# Patient Record
Sex: MALE
[De-identification: names, ages, dates, MRNs, and addresses within clinical notes are randomized per-mention and may not be internally consistent; named-entity substitution may affect disease eponyms.]

---

## 2024-08-05 ENCOUNTER — APPOINTMENT (OUTPATIENT)
Dept: NEPHROLOGY | Facility: CLINIC | Age: 44
End: 2024-08-05

## 2024-08-05 PROBLEM — Z00.00 ENCOUNTER FOR PREVENTIVE HEALTH EXAMINATION: Status: ACTIVE | Noted: 2024-08-05

## 2024-08-05 PROBLEM — R35.1 NOCTURIA: Status: ACTIVE | Noted: 2024-08-05

## 2024-08-05 PROBLEM — E66.9 CLASS 1 OBESITY: Status: ACTIVE | Noted: 2024-08-05

## 2024-08-05 PROBLEM — R79.89 ELEVATED SERUM CREATININE: Status: ACTIVE | Noted: 2024-08-05

## 2024-08-05 PROCEDURE — 99204 OFFICE O/P NEW MOD 45 MIN: CPT | Mod: 25

## 2024-08-05 PROCEDURE — 36415 COLL VENOUS BLD VENIPUNCTURE: CPT

## 2024-08-05 NOTE — HISTORY OF PRESENT ILLNESS
[FreeTextEntry1] : Kindly referred by Dr. Lee for CKD.  * Labs reviewed. [id number]: Creatinine 1.46, eGFR 60 by CKD-EPIcr. Cr was 1.53 in Jul23. Lifts weights.   No exposure to chronic NSAIDs, chronic PPIs, green smoothies, creatine, or herbal supplements. No history of kidney stones or pyelonephritis. No HTN or DM. 3x nocturia for many years. No recent renal ultrasound. No known proteinuria or hematuria.  FH: No kidney disease. SH: No smoking. No ETOH.  All: NKDA. Meds: None.

## 2024-08-05 NOTE — ASSESSMENT
[FreeTextEntry1] : # Elevated creatinine possibly c/w CKD vs. increased muscle mass. * Recheck labs, including cystatin C, monoclonal protein evaluation, urinalysis, and urine albumin quantification. * Check ultrasound. * Therapies for kidney disease: other evidence-based therapies recommended including exercise, a plant-based lower oxalate diet, and 400 mcg folic acid daily * Cardiovascular disease prevention: counseling on healthy diet, physical activity, weight loss, alcohol limitation, blood pressure control * A counseling information sheet on CKD has been given (which they have been instructed to read). * The patient has been counseled that chronic kidney disease is a significant condition and regular office follow-up with me (at least every 1 months for now) is important for monitoring and their health, and that it is their responsibility to make a follow-up appointment. * The patient has been counseled never to stop taking their medications without discussing it with me or another doctor. * The patient has been counseled on avoiding NSAIDs. * The patient has been counseled on risk of worsening kidney function and instructed to immediately call and speak with me and go immediately to ER with any severe symptoms, nausea, vomiting, diarrhea, chest pain, or shortness of breath.  # Nocturia (chronic).  * Check bladder ultrasound.  # Obesity. * Weight loss.

## 2024-08-05 NOTE — CONSULT LETTER
[FreeTextEntry1] : Dear JAYCEE,  I had the pleasure of seeing Darrel Bustillo in consultation for elevated creatinine. My note is attached.  Thank you for the opportunity to participate in the care of your patient.  Please call me on my mobile phone at [phone] or in the office at [phone] if you have any questions.  Best regards,  German Esparza MD, FACP, FASN [address] #[facility], [place], [place] [email or web address] Office: [phone] Mobile: [phone]

## 2024-08-15 ENCOUNTER — APPOINTMENT (OUTPATIENT)
Dept: ULTRASOUND IMAGING | Facility: CLINIC | Age: 44
End: 2024-08-15
Payer: COMMERCIAL

## 2024-08-15 PROCEDURE — 76770 US EXAM ABDO BACK WALL COMP: CPT | Mod: 26

## 2024-09-09 ENCOUNTER — APPOINTMENT (OUTPATIENT)
Dept: NEPHROLOGY | Facility: CLINIC | Age: 44
End: 2024-09-09
Payer: COMMERCIAL

## 2024-09-09 VITALS — SYSTOLIC BLOOD PRESSURE: 124 MMHG | HEART RATE: 74 BPM | DIASTOLIC BLOOD PRESSURE: 69 MMHG

## 2024-09-09 DIAGNOSIS — R79.89 OTHER SPECIFIED ABNORMAL FINDINGS OF BLOOD CHEMISTRY: ICD-10-CM

## 2024-09-09 DIAGNOSIS — R03.0 ELEVATED BLOOD-PRESSURE READING, W/OUT DIAGNOSIS OF HYPERTENSION: ICD-10-CM

## 2024-09-09 DIAGNOSIS — E66.9 OBESITY, UNSPECIFIED: ICD-10-CM

## 2024-09-09 PROCEDURE — 99214 OFFICE O/P EST MOD 30 MIN: CPT

## 2024-09-09 PROCEDURE — G2211 COMPLEX E/M VISIT ADD ON: CPT | Mod: NC

## 2024-09-09 NOTE — ASSESSMENT
[FreeTextEntry1] : # Elevated creatinine most c/w increased muscle mass rather than CKD. * Follow up in 6 months and recheck labs. * Slightly increased echogenicity of ultrasound is of questionable significance.   # Vitamin D deficiency. * Cont vitamin D.  # Borderline blood pressure. * The patient's blood pressure was checked with the [employer] [id number] using the SPRINT trial protocol after sitting quietly in an empty room with arm supported, back supported, and feet on the floor for 5 minutes. The average of 3 readings were taken. * A counseling information sheet on blood pressure and staying healthy has been given (which they have been instructed to read). * The patient has been counseled to check their BP at home with an automatic arm cuff, write down the readings, and reach me directly on the phone immediately if they are persistently > 180 systolic or if SBP is less than 100 or if lightheadedness develops. They were counseled to bring in all blood pressure readings and medications next visit. * The patient has been counseled that regular office follow-up (at least every 6 months for now)  is important for monitoring and for their health, and that it is their responsibility to make follow up appointments. * The patient also has been counseled that they must never stop or change any medications without discussing this with me (or another physician).

## 2024-09-09 NOTE — HISTORY OF PRESENT ILLNESS
[FreeTextEntry1] : Kindly referred by Dr. Lee for CKD.  * Doesn't check BP at home.  No SOB with exertion. No CP. No lightheadedness. * Started on vitamin D for deficiency. * eGFR 59 by creatinine but 86 by cystatin c. eGFR 73 (avg. of CKD-EPIcr & CKD-EPIcys). Borderline increased echogenicity on renal ultrasound.   Previous history (05Aug24): * Labs reviewed. 16Jul24: Creatinine 1.46, eGFR 60 by CKD-EPIcr. Cr was 1.53 in Jul23. Lifts weights.   No exposure to chronic NSAIDs, chronic PPIs, green smoothies, creatine, or herbal supplements. No history of kidney stones or pyelonephritis. No HTN or DM. 3x nocturia for many years. No recent renal ultrasound. No known proteinuria or hematuria.  FH: No kidney disease. SH: No smoking. No ETOH.  All: NKDA. Meds: None.

## 2025-03-10 ENCOUNTER — APPOINTMENT (OUTPATIENT)
Dept: NEPHROLOGY | Facility: CLINIC | Age: 45
End: 2025-03-10